# Patient Record
Sex: FEMALE | URBAN - METROPOLITAN AREA
[De-identification: names, ages, dates, MRNs, and addresses within clinical notes are randomized per-mention and may not be internally consistent; named-entity substitution may affect disease eponyms.]

---

## 2017-09-14 ENCOUNTER — TRANSFERRED RECORDS (OUTPATIENT)
Dept: HEALTH INFORMATION MANAGEMENT | Facility: CLINIC | Age: 62
End: 2017-09-14

## 2017-11-07 ENCOUNTER — CARE COORDINATION (OUTPATIENT)
Dept: GASTROENTEROLOGY | Facility: CLINIC | Age: 62
End: 2017-11-07

## 2017-11-07 NOTE — PROGRESS NOTES
Called pt to set up an appointment with Dr. Benoit per Dr. Benoit.  Left a voice mail message asking for the pt to call to discuss appointment.  Offered via voice mail messag an appt with Dr. Crouch on November 14 at 12 noon. Left my name and number for pt to call back to discuss appt and record retrieval.            Kendra - can we find a place to overbook this patient in my schedule?      Sounds like diarrhea: Question is to rule out IBD.      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  Maybe we can squeeze her in next Tuesday?  Noon, if we can't find an earlier place?

## 2017-11-08 NOTE — TELEPHONE ENCOUNTER
APPT INFO    Date /Time: 11/14/17 12PM    Reason for Appt: Diarrhea sx's     Ref Provider/Clinic: Herb MELO, ?    Are there internal records? If yes, list: No internal recs    Patient Contact (Y/N) & Call Details: Yes, called and LM for pt to return my call.    Action:      OUTSIDE RECORDS CHECKLIST     CLINIC NAME COMMENTS REC (x) IMG (x)   [facility] Recs received and forwarded to Clinic  X None   [facility] Faxed cover sheet to attempt to obtain outside recs

## 2017-11-10 NOTE — TELEPHONE ENCOUNTER
Phone Call:    Who did you talk to? (or) Who did you call? Pt returned my call and LM    Call Detail/Action: Pt has recs at [facility] with Dr. Light   Faxed cover sheet to [facility] to attempt to obtain recs

## 2017-11-10 NOTE — TELEPHONE ENCOUNTER
Phone Call:    Who did you talk to? (or) Who did you call? Called out to pt    Call Detail/Action: Phone call went directly to pt's SHEELA HORTA for pt to return my call regarding outside recs. - 2nd attempt

## 2017-11-13 NOTE — TELEPHONE ENCOUNTER
Records Received From:   [employer]     DATE/EXAM/LOCATION  (specify location if different)   Office Notes:  8/14/17, 9/14/17   Missing:  labs, cultures   - per fax, had labs and stool studies ordered through [facility]

## 2017-11-13 NOTE — TELEPHONE ENCOUNTER
ACTION    What did you do? Faxed cover sheet to [facility] to attempt to obtain outside recs   (Fax # [phone])

## 2017-11-14 ENCOUNTER — PRE VISIT (OUTPATIENT)
Dept: GASTROENTEROLOGY | Facility: CLINIC | Age: 62
End: 2017-11-14

## 2017-11-14 ENCOUNTER — OFFICE VISIT (OUTPATIENT)
Dept: GASTROENTEROLOGY | Facility: CLINIC | Age: 62
End: 2017-11-14

## 2017-11-14 VITALS
SYSTOLIC BLOOD PRESSURE: 115 MMHG | HEART RATE: 78 BPM | OXYGEN SATURATION: 98 % | TEMPERATURE: 98.4 F | HEIGHT: 67 IN | BODY MASS INDEX: 22.3 KG/M2 | DIASTOLIC BLOOD PRESSURE: 74 MMHG | WEIGHT: 142.1 LBS

## 2017-11-14 DIAGNOSIS — R19.7 DIARRHEA, UNSPECIFIED TYPE: Primary | ICD-10-CM

## 2017-11-14 DIAGNOSIS — R19.7 DIARRHEA, UNSPECIFIED TYPE: ICD-10-CM

## 2017-11-14 LAB
BASOPHILS # BLD AUTO: 0.1 10E9/L (ref 0–0.2)
BASOPHILS NFR BLD AUTO: 1.5 %
CRP SERPL-MCNC: <2.9 MG/L (ref 0–8)
DIFFERENTIAL METHOD BLD: NORMAL
EOSINOPHIL # BLD AUTO: 0.1 10E9/L (ref 0–0.7)
EOSINOPHIL NFR BLD AUTO: 2 %
ERYTHROCYTE [DISTWIDTH] IN BLOOD BY AUTOMATED COUNT: 12.8 % (ref 10–15)
ERYTHROCYTE [SEDIMENTATION RATE] IN BLOOD BY WESTERGREN METHOD: 10 MM/H (ref 0–30)
HCT VFR BLD AUTO: 45 % (ref 35–47)
HGB BLD-MCNC: 14.8 G/DL (ref 11.7–15.7)
IMM GRANULOCYTES # BLD: 0 10E9/L (ref 0–0.4)
IMM GRANULOCYTES NFR BLD: 0.2 %
LYMPHOCYTES # BLD AUTO: 2 10E9/L (ref 0.8–5.3)
LYMPHOCYTES NFR BLD AUTO: 32.9 %
MCH RBC QN AUTO: 31.5 PG (ref 26.5–33)
MCHC RBC AUTO-ENTMCNC: 32.9 G/DL (ref 31.5–36.5)
MCV RBC AUTO: 96 FL (ref 78–100)
MONOCYTES # BLD AUTO: 0.4 10E9/L (ref 0–1.3)
MONOCYTES NFR BLD AUTO: 5.7 %
NEUTROPHILS # BLD AUTO: 3.6 10E9/L (ref 1.6–8.3)
NEUTROPHILS NFR BLD AUTO: 57.7 %
NRBC # BLD AUTO: 0 10*3/UL
NRBC BLD AUTO-RTO: 0 /100
PLATELET # BLD AUTO: 286 10E9/L (ref 150–450)
RBC # BLD AUTO: 4.7 10E12/L (ref 3.8–5.2)
WBC # BLD AUTO: 6.1 10E9/L (ref 4–11)

## 2017-11-14 RX ORDER — DICYCLOMINE HYDROCHLORIDE 10 MG/1
10 CAPSULE ORAL 2 TIMES DAILY
Qty: 60 CAPSULE | Refills: 3 | Status: SHIPPED | OUTPATIENT
Start: 2017-11-14

## 2017-11-14 ASSESSMENT — PAIN SCALES - GENERAL: PAINLEVEL: NO PAIN (0)

## 2017-11-14 NOTE — PATIENT INSTRUCTIONS
Nice to meet you today      Labs today  Lab tests today at the 1st floor -- you will be notified of results by letter or my chart message in 7-10 days.  You will receive a phone call if more urgent follow up is needed.    Stool study   You may  your stool sample containers at Lab 1st Floor  before you leave today.  You may drop off the stool samples at any [facility]      Bentyl two times a day then can increase to 4 times a day with meals and at bedtime     Follow up as needed          For questions regarding your care Monday through Friday, contact the RN GI care coordinator,  Call   [phone] option 3 . Your call will be  returned same day, or if consultation is needed with the provider, it may be following business day - or you may send a My Chart message.    For medication refills (prescribed by the GI clinic), contact your pharmacy.    For appointment rescheduling/cancellation, contact [phone]     After hours, or if you have an immediate GI concern and cannot wait for a return call, contact the GI Fellow at [phone] and select option #4.     Thanks Kendra Argueta RN Care Coordinator for Dr. Benoit  Phone   [phone]

## 2017-11-14 NOTE — MR AVS SNAPSHOT
After Visit Summary   11/14/2017    Sarahy Cleary    MRN: [id number]           Patient Information     Date Of Birth          1955        Visit Information        Provider Department      11/14/2017 12:00 PM Margarito Benoit MD [facility]        Today's Diagnoses     Diarrhea, unspecified type    -  1      Care Instructions    Nice to meet you today      Labs today  Lab tests today at the 1st floor -- you will be notified of results by letter or my chart message in 7-10 days.  You will receive a phone call if more urgent follow up is needed.    Stool study   You may  your stool sample containers at Lab 1st Floor  before you leave today.  You may drop off the stool samples at any [place] [facility]      Bentyl two times a day then can increase to 4 times a day with meals and at bedtime     Follow up as needed          For questions regarding your care Monday through Friday, contact the RN GI care coordinator,  Call   [phone] option 3 . Your call will be  returned same day, or if consultation is needed with the provider, it may be following business day - or you may send a My Chart message.    For medication refills (prescribed by the GI clinic), contact your pharmacy.    For appointment rescheduling/cancellation, contact [phone]     After hours, or if you have an immediate GI concern and cannot wait for a return call, contact the GI Fellow at [phone] and select option #4.     Thanks Kendra Argueta RN Care Coordinator for Dr. Benoit  Phone   [phone]             Follow-ups after your visit        Future tests that were ordered for you today     Open Future Orders        Priority Expected Expires Ordered    CBC with platelets differential [TGX717] Routine 11/14/2017 1/13/2018 11/14/2017    CRP inflammation [KJZ5533] Routine 11/14/2017 1/13/2018 11/14/2017    Erythrocyte sedimentation rate auto [NCA681] Routine 11/14/2017 1/13/2018 11/14/2017     "Calprotectin Feces [QYR2955] Routine 2017    Tissue transglutaminase patel IgA and IgG [AFW9295] Routine 2017    Deamidated Gliadin Peptide Patel IgA IgG [BHX1945] Routine 2017            Who to contact     Please call your clinic at [phone] to:    Ask questions about your health    Make or cancel appointments    Discuss your medicines    Learn about your test results    Speak to your doctor   If you have compliments or concerns about an experience at your clinic, or if you wish to file a complaint, please contact [facility] Patient Relations at [phone] or email us at [email or web address]         Additional Information About Your Visit        [employer] Information     [employer] is an electronic gateway that provides easy, online access to your medical records. With [employer], you can request a clinic appointment, read your test results, renew a prescription or communicate with your care team.     To sign up for [employer] visit the website at [email or web address]   You will be asked to enter the access code listed below, as well as some personal information. Please follow the directions to create your username and password.     Your access code is: [email or web address]  Expires: 2018  6:30 AM     Your access code will  in 90 days. If you need help or a new code, please contact your [facility] or call [phone] for assistance.        Care EveryWhere ID     This is your Care EveryWhere ID. This could be used by other organizations to access your [facility] medical records  [id number]        Your Vitals Were     Pulse Temperature Height Pulse Oximetry BMI (Body Mass Index)       78 98.4  F (36.9  C) 1.702 m (5' 7\") 98% 22.26 kg/m2        Blood Pressure from Last 3 Encounters:   17 115/74    Weight from Last 3 Encounters:   17 64.5 kg (142 lb 1.6 oz) "                 Today's Medication Changes          These changes are accurate as of: 11/14/17 12:41 PM.  If you have any questions, ask your nurse or doctor.               Start taking these medicines.        Dose/Directions    dicyclomine 10 MG capsule   Commonly known as:  BENTYL   Used for:  Diarrhea, unspecified type   Started by:  Margarito Benoit MD        Dose:  10 mg   Take 1 capsule (10 mg) by mouth 2 times daily   Quantity:  60 capsule   Refills:  3            Where to get your medicines      [id number] - [place], [place] - [address] [place] [address] AT [address] [facility] [address]  [address], [place] [address]     Phone:  [phone]     dicyclomine 10 MG capsule                Primary Care Provider    None Specified       No primary provider on file.        Equal Access to Services     PASCUAL BAUER : Maria Teresa haynse Sodoris, waaxda luqadaha, qaybta kaalmada candeyarobert, pj stein . So [facility] [phone].    ATENCIÓN: Si habla español, tiene a garrett disposición servicios gratuitos de asistencia lingüística. Llame al [phone].    We comply with applicable federal civil rights laws and [place] laws. We do not discriminate on the basis of race, color, national origin, age, disability, sex, sexual orientation, or gender identity.            Thank you!     Thank you for choosing [facility]  for your care. Our goal is always to provide you with excellent care. Hearing back from our patients is one way we can continue to improve our services. Please take a few minutes to complete the written survey that you may receive in the mail after your visit with us. Thank you!             Your Updated Medication List - Protect others around you: Learn how to safely use, store and throw away your medicines at www.disposemymeds.org.          This list is accurate as of: 11/14/17 12:41 PM.  Always use your most  recent med list.                   Brand Name Dispense Instructions for use Diagnosis    dicyclomine 10 MG capsule    BENTYL    60 capsule    Take 1 capsule (10 mg) by mouth 2 times daily    Diarrhea, unspecified type

## 2017-11-14 NOTE — LETTER
11/14/2017       RE: Sarahy Cleary  [address]  [place] [address]     Dear Colleague,    Thank you for referring your patient, Sarahy Cleary, to the [facility] at [facility]. Please see a copy of my visit note below.    IBD CLINIC VISIT    CC/REFERRING MD:  Referred Self  REASON FOR CONSULTATION: Diarrhea    ASSESSMENT/PLAN:  This is a 62-year-old female with history of some mild constipation who comes in with continued diarrhea after a gastroenteritis positive for Shiga toxin in the stool by PCR.      1.  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  She had a particularly severe infectious gastroenteritis with a history of a Shiga toxin producing bacteria.  Suspect that this was a true gastroenteritis and that she is now undergoing postinfectious IBS.  She has no red flags for an underlying inflammatory disorder such as Crohn's or ulcerative colitis such as blood in her stool, weight loss or abdominal pain.  However, given her chronic symptoms, I think it is reasonable to obtain some basic blood work including a CBC and inflammatory markers.  We will also check a stool for fecal calprotectin.  We will screen her for celiac disease.   --  Blood work today and stool for inflammation.   -- If fecal calprotectin elevated, would pursue colonoscopy.   --  If celiac markers positive, we will have her follow up with nutrition for a gluten-free diet.   -- Will recommend a trial of Bentyl for pelvic pressure.   -- Recommend soluable fiber such as psyllium.   -- Rrecommend she continue to try to identify trigger foods and suggested that high fiber foods may be difficult to tolerate at this time.      She is up-to-date on her screening colonoscopies with a colonoscopy in 04/2016 at [facility].  She should continue with age-appropriate screening colonoscopies at this time.      She does not need specific followup in  my office; however, I told her I remain open for new questions but suspect that she will improve over time.  I did inform her that postinfectious IBS could take up to 12 months to resolve.     Thank you for this consultation.  It was a pleasure to participate in the care of this patient; please contact us with any further questions.      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  The patient developed a severe gastroenteritis on 07/24/2017.  Symptoms started settling with a substantial amount of diarrhea.  She had a history of C. diff in the past and treated herself with a 10-day course of Flagyl, which did not improve anything.  She went to the [facility] where she works and has some stool testing done that was negative.  She continued to have symptoms and so eventually went to [facility] where she had a stool PCR testing done which returned positive for Shiga toxin PCR.  She did not receive any further treatment at that time given as she was not immunocompromised or had any other risk factors.  She saw an Infectious Disease doctor at [facility] who thought she probably had a postinfectious diarrhea, but she continued to have diarrhea and wanted to speak to the gastroenterologist.      She continues to have loose stools, up to 6 a day.  She has the largest stool in the morning, and then throughout the rest of the day, she will have urgency, low pelvic pressure and occasionally pass small amounts of unformed stool that is sometimes stringy and nondigested.  She has a lot of gas.  There is no blood in her stools.  She did have 1 episode which she thought was an ileus during this time when she did not pass any stool and had a substantial amount of bloating; however, that has resolved.      She  has tried FODMAP diet, although was not particularly strict with it, but she did not any benefit.  She has also tried probiotics but had not had any specific benefit.      Of note, she does have a history of constipation for which she has used fiber in the past.  She also was taking some occasional NSAID, although she has not taken any in the past month and does not use any chronic NSAIDs.     ROS:    No fevers or chills  No weight loss  No blurry vision, double vision or change in vision  No sore throat  No lymphadenopathy  No headache, paraesthesias, or weakness in a limb  No shortness of breath or wheezing  No chest pain or pressure  No arthralgias or myalgias  No rashes or skin changes  No odynophagia or dysphagia  No BRBPR, hematochezia, melena  No dysuria, frequency or urgency  No hot/cold intolerance or polyria  No anxiety or depression    Extra intestinal manifestations of IBD:  No uveitis/episcleritis  No aphthous ulcers   No arthritis   No erythema nodosum/pyoderma gangrenosum.     PERTINENT PAST MEDICAL HISTORY:  Past Medical History:   Diagnosis Date     Breast cancer (H)        PREVIOUS SURGERIES:  Past Surgical History:   Procedure Laterality Date     MASTECTOMY, BILATERAL         PREVIOUS ENDOSCOPY:  Colonoscopy 4/1/16  - The examined portion of the ileum was normal.  - Tortuous colon.  - External and internal hemorrhoids.  - No specimens collected.    ALLERGIES:   No Known Allergies    PERTINENT MEDICATIONS:  No current outpatient prescriptions on file.    SOCIAL HISTORY:  Social History     Social History     Marital status:      Spouse name: N/A     Number of children: N/A     Years of education: N/A     Occupational History     Not on file.     Social History Main Topics     Smoking status: Former Smoker     Smokeless tobacco: Not on file     Alcohol use Not on file     Drug use: Not on file     Sexual activity: Not on file     Other Topics Concern     Not on file     Social History  "Narrative     No narrative on file       FAMILY HISTORY:  Family History   Problem Relation Age of Onset     Coronary Artery Disease Father      Ulcerative Colitis Niece      Mother with undiagnosed rheumatologic disease (potentially PMR)    Past/family/social history reviewed and no changes    PHYSICAL EXAMINATION:  Constitutional: aaox3, cooperative, pleasant, not dyspneic/diaphoretic, no acute distress  Vitals reviewed: /74 (BP Location: Left arm, Patient Position: Chair, Cuff Size: Adult Regular)  Pulse 78  Temp 98.4  F (36.9  C)  Ht 1.702 m (5' 7\")  Wt 64.5 kg (142 lb 1.6 oz)  SpO2 98%  BMI 22.26 kg/m2  Wt:   Wt Readings from Last 2 Encounters:   11/14/17 64.5 kg (142 lb 1.6 oz)      Eyes: Sclera anicteric/injected  Ears/nose/mouth/throat: Normal oropharynx without ulcers or exudate, mucus membranes moist, hearing intact  Neck: supple, thyroid normal size  CV: No edema  Respiratory: Unlabored breathing  Lymph: No axillary, submandibular, supraclavicular or inguinal lymphadenopathy  Abd: Nondistended, +bs, no hepatosplenomegaly, nontender, no peritoneal signs  Skin: warm, perfused, no jaundice  Psych: Normal affect  MSK: Normal gait    PERTINENT STUDIES:  Most recent CBC:  No lab results found.  Most recent hepatic panel:  No lab results found.    Invalid input(s): TB, ALK  Most recent creatinine:  No lab results found.            Again, thank you for allowing me to participate in the care of your patient.      Sincerely,    Margarito Benoit MD      "

## 2017-11-14 NOTE — LETTER
Date:November 22, 2017      Patient was self referred, no letter generated. Do not send.        [facility] Health Information

## 2017-11-14 NOTE — NURSING NOTE
Printed after visit summary given to pt along with verbal instructions.  Pt sent to the lab. Pt signed up for my chart.

## 2017-11-14 NOTE — TELEPHONE ENCOUNTER
Records Received From:  [facility]     DATE/EXAM/LOCATION  (specify location if different)   Office Notes:  7/31/17   Labs:  2017

## 2017-11-14 NOTE — NURSING NOTE
"Chief Complaint   Patient presents with     Consult     New Consultation       Vitals:    11/14/17 1147   BP: 115/74   BP Location: Left arm   Patient Position: Chair   Cuff Size: Adult Regular   Pulse: 78   Temp: 98.4  F (36.9  C)   SpO2: 98%   Weight: 142 lb 1.6 oz   Height: 5' 7\"       Body mass index is 22.26 kg/(m^2).      Fransisca Hargrove                          "

## 2017-11-14 NOTE — PROGRESS NOTES
IBD CLINIC VISIT    CC/REFERRING MD:  Referred Self  REASON FOR CONSULTATION: Diarrhea    ASSESSMENT/PLAN:  This is a 62-year-old female with history of some mild constipation who comes in with continued diarrhea after a gastroenteritis positive for Shiga toxin in the stool by PCR.      1.  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  She had a particularly severe infectious gastroenteritis with a history of a Shiga toxin producing bacteria.  Suspect that this was a true gastroenteritis and that she is now undergoing postinfectious IBS.  She has no red flags for an underlying inflammatory disorder such as Crohn's or ulcerative colitis such as blood in her stool, weight loss or abdominal pain.  However, given her chronic symptoms, I think it is reasonable to obtain some basic blood work including a CBC and inflammatory markers.  We will also check a stool for fecal calprotectin.  We will screen her for celiac disease.   --  Blood work today and stool for inflammation.   -- If fecal calprotectin elevated, would pursue colonoscopy.   --  If celiac markers positive, we will have her follow up with nutrition for a gluten-free diet.   -- Will recommend a trial of Bentyl for pelvic pressure.   -- Recommend soluable fiber such as psyllium.   -- Rrecommend she continue to try to identify trigger foods and suggested that high fiber foods may be difficult to tolerate at this time.      She is up-to-date on her screening colonoscopies with a colonoscopy in 04/2016 at [facility].  She should continue with age-appropriate screening colonoscopies at this time.      She does not need specific followup in my office; however, I told her I remain open for new questions but suspect that she will improve over time.  I did inform her that postinfectious IBS could take up to 12 months to resolve.     Thank you for this consultation.  It was a pleasure to participate in the care of this patient;  please contact us with any further questions.      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  The patient developed a severe gastroenteritis on 07/24/2017.  Symptoms started settling with a substantial amount of diarrhea.  She had a history of C. diff in the past and treated herself with a 10-day course of Flagyl, which did not improve anything.  She went to the [facility] where she works and has some stool testing done that was negative.  She continued to have symptoms and so eventually went to [facility] where she had a stool PCR testing done which returned positive for Shiga toxin PCR.  She did not receive any further treatment at that time given as she was not immunocompromised or had any other risk factors.  [facility] thought she probably had a postinfectious diarrhea, but she continued to have diarrhea and wanted to speak to the gastroenterologist.      She continues to have loose stools, up to 6 a day.  She has the largest stool in the morning, and then throughout the rest of the day, she will have urgency, low pelvic pressure and occasionally pass small amounts of unformed stool that is sometimes stringy and nondigested.  She has a lot of gas.  There is no blood in her stools.  She did have 1 episode which she thought was an ileus during this time when she did not pass any stool and had a substantial amount of bloating; however, that has resolved.      She has tried FODMAP diet, although was not particularly strict with it, but she did not any benefit.  She has also tried probiotics but had not had any specific benefit.      Of note, she does have a history of constipation for which she has used fiber in the past.  She also was taking some  occasional NSAID, although she has not taken any in the past month and does not use any chronic NSAIDs.     ROS:    No fevers or chills  No weight loss  No blurry vision, double vision or change in vision  No sore throat  No lymphadenopathy  No headache, paraesthesias, or weakness in a limb  No shortness of breath or wheezing  No chest pain or pressure  No arthralgias or myalgias  No rashes or skin changes  No odynophagia or dysphagia  No BRBPR, hematochezia, melena  No dysuria, frequency or urgency  No hot/cold intolerance or polyria  No anxiety or depression    Extra intestinal manifestations of IBD:  No uveitis/episcleritis  No aphthous ulcers   No arthritis   No erythema nodosum/pyoderma gangrenosum.     PERTINENT PAST MEDICAL HISTORY:  Past Medical History:   Diagnosis Date     Breast cancer (H)        PREVIOUS SURGERIES:  Past Surgical History:   Procedure Laterality Date     MASTECTOMY, BILATERAL         PREVIOUS ENDOSCOPY:  Colonoscopy 4/1/16  - The examined portion of the ileum was normal.  - Tortuous colon.  - External and internal hemorrhoids.  - No specimens collected.    ALLERGIES:   No Known Allergies    PERTINENT MEDICATIONS:  No current outpatient prescriptions on file.    SOCIAL HISTORY:  Social History     Social History     Marital status:      Spouse name: N/A     Number of children: N/A     Years of education: N/A     Occupational History     Not on file.     Social History Main Topics     Smoking status: Former Smoker     Smokeless tobacco: Not on file     Alcohol use Not on file     Drug use: Not on file     Sexual activity: Not on file     Other Topics Concern     Not on file     Social History Narrative     No narrative on file       FAMILY HISTORY:  Family History   Problem Relation Age of Onset     Coronary Artery Disease Father      Ulcerative Colitis Niece      Mother with undiagnosed rheumatologic disease (potentially PMR)    Past/family/social history reviewed and no  "changes    PHYSICAL EXAMINATION:  Constitutional: aaox3, cooperative, pleasant, not dyspneic/diaphoretic, no acute distress  Vitals reviewed: /74 (BP Location: Left arm, Patient Position: Chair, Cuff Size: Adult Regular)  Pulse 78  Temp 98.4  F (36.9  C)  Ht 1.702 m (5' 7\")  Wt 64.5 kg (142 lb 1.6 oz)  SpO2 98%  BMI 22.26 kg/m2  Wt:   Wt Readings from Last 2 Encounters:   11/14/17 64.5 kg (142 lb 1.6 oz)      Eyes: Sclera anicteric/injected  Ears/nose/mouth/throat: Normal oropharynx without ulcers or exudate, mucus membranes moist, hearing intact  Neck: supple, thyroid normal size  CV: No edema  Respiratory: Unlabored breathing  Lymph: No axillary, submandibular, supraclavicular or inguinal lymphadenopathy  Abd: Nondistended, +bs, no hepatosplenomegaly, nontender, no peritoneal signs  Skin: warm, perfused, no jaundice  Psych: Normal affect  MSK: Normal gait    PERTINENT STUDIES:  Most recent CBC:  No lab results found.  Most recent hepatic panel:  No lab results found.    Invalid input(s): TB, ALK  Most recent creatinine:  No lab results found.          "

## 2017-11-15 LAB
GLIADIN IGA SER-ACNC: 1 U/ML
GLIADIN IGG SER-ACNC: 2 U/ML
TTG IGA SER-ACNC: <1 U/ML
TTG IGG SER-ACNC: <1 U/ML

## 2017-11-19 ENCOUNTER — HEALTH MAINTENANCE LETTER (OUTPATIENT)
Age: 62
End: 2017-11-19

## 2017-11-20 DIAGNOSIS — R19.7 DIARRHEA, UNSPECIFIED TYPE: ICD-10-CM

## 2017-11-22 LAB — CALPROTECTIN STL-MCNT: <27 MG/KG (ref 0–49.9)

## 2019-11-08 ENCOUNTER — HEALTH MAINTENANCE LETTER (OUTPATIENT)
Age: 64
End: 2019-11-08

## 2020-02-23 ENCOUNTER — HEALTH MAINTENANCE LETTER (OUTPATIENT)
Age: 65
End: 2020-02-23

## 2020-12-06 ENCOUNTER — HEALTH MAINTENANCE LETTER (OUTPATIENT)
Age: 65
End: 2020-12-06

## 2021-09-25 ENCOUNTER — HEALTH MAINTENANCE LETTER (OUTPATIENT)
Age: 66
End: 2021-09-25

## 2022-01-15 ENCOUNTER — HEALTH MAINTENANCE LETTER (OUTPATIENT)
Age: 67
End: 2022-01-15

## 2023-04-22 ENCOUNTER — HEALTH MAINTENANCE LETTER (OUTPATIENT)
Age: 68
End: 2023-04-22